# Patient Record
Sex: FEMALE | Race: WHITE | NOT HISPANIC OR LATINO | Employment: OTHER | URBAN - METROPOLITAN AREA
[De-identification: names, ages, dates, MRNs, and addresses within clinical notes are randomized per-mention and may not be internally consistent; named-entity substitution may affect disease eponyms.]

---

## 2023-12-07 ENCOUNTER — TRANSCRIBE ORDERS (OUTPATIENT)
Dept: ADMINISTRATIVE | Facility: HOSPITAL | Age: 43
End: 2023-12-07
Payer: MEDICARE

## 2023-12-07 DIAGNOSIS — Z12.31 SCREENING MAMMOGRAM, ENCOUNTER FOR: Primary | ICD-10-CM

## 2023-12-16 ENCOUNTER — HOSPITAL ENCOUNTER (OUTPATIENT)
Dept: MAMMOGRAPHY | Facility: HOSPITAL | Age: 43
Discharge: HOME | End: 2023-12-16
Admitting: FAMILY MEDICINE
Payer: MEDICARE

## 2023-12-16 DIAGNOSIS — Z12.31 SCREENING MAMMOGRAM, ENCOUNTER FOR: ICD-10-CM

## 2023-12-16 PROCEDURE — 77067 SCR MAMMO BI INCL CAD: CPT

## 2023-12-16 PROCEDURE — 77063 BREAST TOMOSYNTHESIS BI: CPT

## 2023-12-24 ENCOUNTER — HOSPITAL ENCOUNTER (EMERGENCY)
Facility: HOSPITAL | Age: 43
Discharge: HOME | End: 2023-12-24
Attending: EMERGENCY MEDICINE | Admitting: EMERGENCY MEDICINE
Payer: MEDICARE

## 2023-12-24 ENCOUNTER — APPOINTMENT (OUTPATIENT)
Dept: GENERAL RADIOLOGY | Facility: HOSPITAL | Age: 43
End: 2023-12-24
Payer: MEDICARE

## 2023-12-24 VITALS
OXYGEN SATURATION: 97 % | SYSTOLIC BLOOD PRESSURE: 134 MMHG | DIASTOLIC BLOOD PRESSURE: 77 MMHG | HEART RATE: 82 BPM | TEMPERATURE: 102.2 F | RESPIRATION RATE: 20 BRPM

## 2023-12-24 DIAGNOSIS — J10.1 INFLUENZA A: Primary | ICD-10-CM

## 2023-12-24 LAB
ALBUMIN SERPL-MCNC: 4.1 G/DL (ref 3.5–5.2)
ALBUMIN/GLOB SERPL: 1.9 G/DL
ALP SERPL-CCNC: 66 U/L (ref 39–117)
ALT SERPL W P-5'-P-CCNC: 14 U/L (ref 1–33)
ANION GAP SERPL CALCULATED.3IONS-SCNC: 11 MMOL/L (ref 5–15)
AST SERPL-CCNC: 18 U/L (ref 1–32)
B PARAPERT DNA SPEC QL NAA+PROBE: NOT DETECTED
B PERT DNA SPEC QL NAA+PROBE: NOT DETECTED
BASOPHILS # BLD AUTO: 0.05 10*3/MM3 (ref 0–0.2)
BASOPHILS NFR BLD AUTO: 0.8 % (ref 0–1.5)
BILIRUB SERPL-MCNC: <0.2 MG/DL (ref 0–1.2)
BUN SERPL-MCNC: 8 MG/DL (ref 6–20)
BUN/CREAT SERPL: 9.6 (ref 7–25)
C PNEUM DNA NPH QL NAA+NON-PROBE: NOT DETECTED
CALCIUM SPEC-SCNC: 8.8 MG/DL (ref 8.6–10.5)
CHLORIDE SERPL-SCNC: 102 MMOL/L (ref 98–107)
CO2 SERPL-SCNC: 24 MMOL/L (ref 22–29)
CREAT SERPL-MCNC: 0.83 MG/DL (ref 0.57–1)
DEPRECATED RDW RBC AUTO: 48.7 FL (ref 37–54)
EGFRCR SERPLBLD CKD-EPI 2021: 89.3 ML/MIN/1.73
EOSINOPHIL # BLD AUTO: 0.04 10*3/MM3 (ref 0–0.4)
EOSINOPHIL NFR BLD AUTO: 0.6 % (ref 0.3–6.2)
ERYTHROCYTE [DISTWIDTH] IN BLOOD BY AUTOMATED COUNT: 13.1 % (ref 12.3–15.4)
FLUAV H1 2009 PAND RNA NPH QL NAA+PROBE: DETECTED
FLUBV RNA ISLT QL NAA+PROBE: NOT DETECTED
GLOBULIN UR ELPH-MCNC: 2.2 GM/DL
GLUCOSE SERPL-MCNC: 88 MG/DL (ref 65–99)
HADV DNA SPEC NAA+PROBE: NOT DETECTED
HCOV 229E RNA SPEC QL NAA+PROBE: NOT DETECTED
HCOV HKU1 RNA SPEC QL NAA+PROBE: NOT DETECTED
HCOV NL63 RNA SPEC QL NAA+PROBE: NOT DETECTED
HCOV OC43 RNA SPEC QL NAA+PROBE: NOT DETECTED
HCT VFR BLD AUTO: 36.5 % (ref 34–46.6)
HGB BLD-MCNC: 11.9 G/DL (ref 12–15.9)
HMPV RNA NPH QL NAA+NON-PROBE: NOT DETECTED
HPIV1 RNA ISLT QL NAA+PROBE: NOT DETECTED
HPIV2 RNA SPEC QL NAA+PROBE: NOT DETECTED
HPIV3 RNA NPH QL NAA+PROBE: NOT DETECTED
HPIV4 P GENE NPH QL NAA+PROBE: NOT DETECTED
IMM GRANULOCYTES # BLD AUTO: 0.02 10*3/MM3 (ref 0–0.05)
IMM GRANULOCYTES NFR BLD AUTO: 0.3 % (ref 0–0.5)
LYMPHOCYTES # BLD AUTO: 0.51 10*3/MM3 (ref 0.7–3.1)
LYMPHOCYTES NFR BLD AUTO: 7.8 % (ref 19.6–45.3)
M PNEUMO IGG SER IA-ACNC: NOT DETECTED
MCH RBC QN AUTO: 32.7 PG (ref 26.6–33)
MCHC RBC AUTO-ENTMCNC: 32.6 G/DL (ref 31.5–35.7)
MCV RBC AUTO: 100.3 FL (ref 79–97)
MONOCYTES # BLD AUTO: 0.67 10*3/MM3 (ref 0.1–0.9)
MONOCYTES NFR BLD AUTO: 10.3 % (ref 5–12)
NEUTROPHILS NFR BLD AUTO: 5.21 10*3/MM3 (ref 1.7–7)
NEUTROPHILS NFR BLD AUTO: 80.2 % (ref 42.7–76)
NRBC BLD AUTO-RTO: 0 /100 WBC (ref 0–0.2)
PLATELET # BLD AUTO: 236 10*3/MM3 (ref 140–450)
PMV BLD AUTO: 9.9 FL (ref 6–12)
POTASSIUM SERPL-SCNC: 3.9 MMOL/L (ref 3.5–5.2)
PROT SERPL-MCNC: 6.3 G/DL (ref 6–8.5)
RBC # BLD AUTO: 3.64 10*6/MM3 (ref 3.77–5.28)
RHINOVIRUS RNA SPEC NAA+PROBE: NOT DETECTED
RSV RNA NPH QL NAA+NON-PROBE: NOT DETECTED
SARS-COV-2 RNA NPH QL NAA+NON-PROBE: NOT DETECTED
SODIUM SERPL-SCNC: 137 MMOL/L (ref 136–145)
TROPONIN T SERPL HS-MCNC: 11 NG/L
WBC NRBC COR # BLD AUTO: 6.5 10*3/MM3 (ref 3.4–10.8)

## 2023-12-24 PROCEDURE — 25810000003 SODIUM CHLORIDE 0.9 % SOLUTION: Performed by: EMERGENCY MEDICINE

## 2023-12-24 PROCEDURE — 84484 ASSAY OF TROPONIN QUANT: CPT | Performed by: EMERGENCY MEDICINE

## 2023-12-24 PROCEDURE — 93005 ELECTROCARDIOGRAM TRACING: CPT | Performed by: EMERGENCY MEDICINE

## 2023-12-24 PROCEDURE — 0202U NFCT DS 22 TRGT SARS-COV-2: CPT | Performed by: EMERGENCY MEDICINE

## 2023-12-24 PROCEDURE — 71045 X-RAY EXAM CHEST 1 VIEW: CPT

## 2023-12-24 PROCEDURE — 85025 COMPLETE CBC W/AUTO DIFF WBC: CPT | Performed by: EMERGENCY MEDICINE

## 2023-12-24 PROCEDURE — 80053 COMPREHEN METABOLIC PANEL: CPT | Performed by: EMERGENCY MEDICINE

## 2023-12-24 PROCEDURE — 99284 EMERGENCY DEPT VISIT MOD MDM: CPT

## 2023-12-24 RX ORDER — ACETAMINOPHEN 500 MG
1000 TABLET ORAL ONCE
Status: COMPLETED | OUTPATIENT
Start: 2023-12-24 | End: 2023-12-24

## 2023-12-24 RX ADMIN — SODIUM CHLORIDE 1000 ML: 9 INJECTION, SOLUTION INTRAVENOUS at 16:48

## 2023-12-24 RX ADMIN — ACETAMINOPHEN 1000 MG: 500 TABLET ORAL at 16:48

## 2023-12-24 NOTE — ED NOTES
Patient to ER via car from home for fever cough and rash on mouth x 3 days  Patient reports sore throat that is getting better  Patient reports cough is making her SOA

## 2023-12-24 NOTE — ED PROVIDER NOTES
EMERGENCY DEPARTMENT ENCOUNTER    Room Number:  35/35  PCP: Yesika Vanegas APRN  Historian: Patient      HPI:  Chief Complaint: Fever cough congestion  A complete HPI/ROS/PMH/PSH/SH/FH are unobtainable due to: None  Context: Judi Garcia is a 43 y.o. female who presents to the ED c/o fever cough congestion.  Patient states started 2 to 3 days ago.  Patient had sore throat.  Now has cough and congestion.  Has had body aches and fevers.  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  Patient has had some blisters on the outside of her mouth.  No lesions on her hands and feet.  Patient has had no vomiting or diarrhea.  Patient has had no chest pain.  States she has had a little bit of shortness of breath.  Has had no sick contacts.            PAST MEDICAL HISTORY  Active Ambulatory Problems     Diagnosis Date Noted    No Active Ambulatory Problems     Resolved Ambulatory Problems     Diagnosis Date Noted    No Resolved Ambulatory Problems     No Additional Past Medical History         PAST SURGICAL HISTORY  No past surgical history on file.      FAMILY HISTORY  No family history on file.      SOCIAL HISTORY  Social History     Socioeconomic History    Marital status:          ALLERGIES  Patient has no known allergies.        REVIEW OF SYSTEMS  Review of Systems   Cough congestion sore throat short of breath      PHYSICAL EXAM  ED Triage Vitals [12/24/23 1622]   Temp Heart Rate Resp BP SpO2   (!) 102.2 °F (39 °C) 90 20 -- 100 %      Temp src Heart Rate Source Patient Position BP Location FiO2 (%)   -- -- -- -- --       Physical Exam      GENERAL: no acute distress  HENT: nares patent  EYES: no scleral icterus  CV: regular rhythm, normal rate  RESPIRATORY: normal effort  ABDOMEN: soft  MUSCULOSKELETAL: no deformity  NEURO: alert, moves all extremities, follows commands  PSYCH:  calm, cooperative  SKIN: warm, dry    Vital signs and nursing notes reviewed.          LAB RESULTS  Recent  Results (from the past 24 hour(s))   ECG 12 Lead Dyspnea    Collection Time: 12/24/23  4:35 PM   Result Value Ref Range    QT Interval 367 ms    QTC Interval 424 ms   Single High Sensitivity Troponin T    Collection Time: 12/24/23  4:47 PM    Specimen: Blood   Result Value Ref Range    HS Troponin T 11 <14 ng/L   Comprehensive Metabolic Panel    Collection Time: 12/24/23  4:47 PM    Specimen: Blood   Result Value Ref Range    Glucose 88 65 - 99 mg/dL    BUN 8 6 - 20 mg/dL    Creatinine 0.83 0.57 - 1.00 mg/dL    Sodium 137 136 - 145 mmol/L    Potassium 3.9 3.5 - 5.2 mmol/L    Chloride 102 98 - 107 mmol/L    CO2 24.0 22.0 - 29.0 mmol/L    Calcium 8.8 8.6 - 10.5 mg/dL    Total Protein 6.3 6.0 - 8.5 g/dL    Albumin 4.1 3.5 - 5.2 g/dL    ALT (SGPT) 14 1 - 33 U/L    AST (SGOT) 18 1 - 32 U/L    Alkaline Phosphatase 66 39 - 117 U/L    Total Bilirubin <0.2 0.0 - 1.2 mg/dL    Globulin 2.2 gm/dL    A/G Ratio 1.9 g/dL    BUN/Creatinine Ratio 9.6 7.0 - 25.0    Anion Gap 11.0 5.0 - 15.0 mmol/L    eGFR 89.3 >60.0 mL/min/1.73   CBC Auto Differential    Collection Time: 12/24/23  4:47 PM    Specimen: Blood   Result Value Ref Range    WBC 6.50 3.40 - 10.80 10*3/mm3    RBC 3.64 (L) 3.77 - 5.28 10*6/mm3    Hemoglobin 11.9 (L) 12.0 - 15.9 g/dL    Hematocrit 36.5 34.0 - 46.6 %    .3 (H) 79.0 - 97.0 fL    MCH 32.7 26.6 - 33.0 pg    MCHC 32.6 31.5 - 35.7 g/dL    RDW 13.1 12.3 - 15.4 %    RDW-SD 48.7 37.0 - 54.0 fl    MPV 9.9 6.0 - 12.0 fL    Platelets 236 140 - 450 10*3/mm3    Neutrophil % 80.2 (H) 42.7 - 76.0 %    Lymphocyte % 7.8 (L) 19.6 - 45.3 %    Monocyte % 10.3 5.0 - 12.0 %    Eosinophil % 0.6 0.3 - 6.2 %    Basophil % 0.8 0.0 - 1.5 %    Immature Grans % 0.3 0.0 - 0.5 %    Neutrophils, Absolute 5.21 1.70 - 7.00 10*3/mm3    Lymphocytes, Absolute 0.51 (L) 0.70 - 3.10 10*3/mm3    Monocytes, Absolute 0.67 0.10 - 0.90 10*3/mm3    Eosinophils, Absolute 0.04 0.00 - 0.40 10*3/mm3    Basophils, Absolute 0.05 0.00 - 0.20 10*3/mm3     Immature Grans, Absolute 0.02 0.00 - 0.05 10*3/mm3    nRBC 0.0 0.0 - 0.2 /100 WBC   Respiratory Panel PCR w/COVID-19(SARS-CoV-2) [facility]/G[facility]EEVAN In-House, NP Swab in UTM/VTM, 2 HR TAT - Swab, Nasopharynx    Collection Time: 12/24/23  4:50 PM    Specimen: Nasopharynx; Swab   Result Value Ref Range    ADENOVIRUS, PCR Not Detected Not Detected    Coronavirus 229E Not Detected Not Detected    Coronavirus HKU1 Not Detected Not Detected    Coronavirus NL63 Not Detected Not Detected    Coronavirus OC43 Not Detected Not Detected    COVID19 Not Detected Not Detected - Ref. Range    Human Metapneumovirus Not Detected Not Detected    Human Rhinovirus/Enterovirus Not Detected Not Detected    Influenza A H1 2009 PCR Detected (A) Not Detected    Influenza B PCR Not Detected Not Detected    Parainfluenza Virus 1 Not Detected Not Detected    Parainfluenza Virus 2 Not Detected Not Detected    Parainfluenza Virus 3 Not Detected Not Detected    Parainfluenza Virus 4 Not Detected Not Detected    RSV, PCR Not Detected Not Detected    Bordetella pertussis pcr Not Detected Not Detected    Bordetella parapertussis PCR Not Detected Not Detected    Chlamydophila pneumoniae PCR Not Detected Not Detected    Mycoplasma pneumo by PCR Not Detected Not Detected       Ordered the above labs and reviewed the results.        RADIOLOGY  XR Chest 1 View    Result Date: 12/24/2023  XR CHEST 1 VW-  HISTORY: Shortness of air, cough and fever.  COMPARISON: None  FINDINGS: A single view of the chest was obtained.  Support Devices:  None. Cardiac Silhouette/Mediastinum/Tarsha:  The cardiac, mediastinal, and hilar contours are within normal limits. Lungs/Pleural Spaces:  The lungs and pleural spaces are clear. Chest Wall/Diaphragm/Upper Abdomen:  The thoracic musculoskeletal structures and the upper abdomen are within normal limits.   CONCLUSION(S):   1.  No focal consolidation or effusion.  This report was finalized on 12/24/2023 5:00 PM by Dr. Raza  Herman SANCHEZ on Workstation: [id number]       Ordered the above noted radiological studies.  Chest x-ray independently interpreted by me and shows no evidence of pneumonia          PROCEDURES  Procedures      EKG          EKG time: 1635  Rhythm/Rate: Normal sinus rhythm 80  P waves and PA: Normal P waves  QRS, axis: Normal QRS  ST and T waves: Normal ST-T wave    Interpreted Contemporaneously by me, independently viewed  No prior      MEDICATIONS GIVEN IN ER  Medications   acetaminophen (TYLENOL) tablet 1,000 mg (1,000 mg Oral Given 12/24/23 1648)   sodium chloride 0.9 % bolus 1,000 mL (1,000 mL Intravenous New Bag 12/24/23 1648)                   MEDICAL DECISION MAKING, PROGRESS, and CONSULTS     Discussion below represents my analysis of pertinent findings related to patient's condition, differential diagnosis, treatment plan and final disposition.      Additional sources:  - Discussed/ obtained information from independent historians: None    - External (non-ED) record review: Epic reviewed and patient seen for anxiety by primary provider 8/30/2023    - Chronic or social conditions impacting care: None    - Shared decision making: None      Orders placed during this visit:  Orders Placed This Encounter   Procedures    Respiratory Panel PCR w/COVID-19(SARS-CoV-2) [facility] In-House, NP Swab in UTM/VTM, 2 HR TAT - Swab, Nasopharynx    XR Chest 1 View    Single High Sensitivity Troponin T    Comprehensive Metabolic Panel    CBC Auto Differential    ECG 12 Lead Dyspnea    CBC & Differential         Additional orders considered but not ordered:  None        Differential diagnosis includes but is not limited to:    Influenza versus pneumonia versus other viral illness      Independent interpretation of labs, radiology studies, and discussions with consultants:  ED Course as of 12/24/23 1828   Sun Dec 24, 2023   1822 18:22 EST  Patient presents for evaluation of cough and fever.  Patient is influenza  positive.  Patient does have history of viral myocarditis however patient's heart rate is good EKG normal, troponin normal.  Patient states her symptoms probably were more like 3 to 4 days.  Tamiflu probably not effective.  Will be discharged home.  Instructed to return for worsening symptoms [SL]      ED Course User Index  [SL] Logan Morrissey MD                 DIAGNOSIS  Final diagnoses:   Influenza A         DISPOSITION  DISCHARGE    Patient discharged in stable condition.    Reviewed implications of results, diagnosis, meds, responsibility to follow up, warning signs and symptoms of possible worsening, potential complications and reasons to return to ER, including worsening symptoms    Patient/Family voiced understanding of above instructions.    Discussed plan for discharge, as there is no emergent indication for admission. Patient referred to primary care provider for BP management due to today's BP. Pt/family is agreeable and understands need for follow up and repeat testing.  Pt is aware that discharge does not mean that nothing is wrong but it indicates no emergency is present that requires admission and they must continue care with follow-up as given below or physician of their choice.     FOLLOW-UP  ViaYesika APRN  [address] [address]  [phone]    Schedule an appointment as soon as possible for a visit            Medication List      No changes were made to your prescriptions during this visit.                  Latest Documented Vital Signs:  As of 18:28 EST  BP- 134/77 HR- 82 Temp- (!) 102.2 °F (39 °C) O2 sat- 97%              --    Please note that portions of this were completed with a voice recognition program.       Note Disclaimer: At [facility], we believe that sharing information builds trust and better relationships. You are receiving this note because you are receiving care at [facility] or recently visited. It is possible you will see [facility]  information before a provider has talked with you about it. This kind of information can be easy to misunderstand. To help you fully understand what it means for your health, we urge you to discuss this note with your provider.            Logan Morrissey MD  12/24/23 1923

## 2023-12-27 ENCOUNTER — NURSE TRIAGE (OUTPATIENT)
Dept: CALL CENTER | Facility: HOSPITAL | Age: 43
End: 2023-12-27
Payer: MEDICARE

## 2023-12-27 ENCOUNTER — TELEPHONE (OUTPATIENT)
Dept: PEDIATRICS | Facility: OTHER | Age: 43
End: 2023-12-27
Payer: MEDICARE

## 2023-12-27 LAB
QT INTERVAL: 367 MS
QTC INTERVAL: 424 MS

## 2023-12-27 NOTE — TELEPHONE ENCOUNTER
Says she does not have a PCP. She is in between doctors.  She had an appt today but had to cancel she was afraid to drive due to coughing.  Feels she needs a breathing treatment.  Care advice per guideline.        Reason for Disposition   SEVERE coughing spells (e.g., whooping sound after coughing, vomiting after coughing)    Additional Information   Negative: SEVERE difficulty breathing (e.g., struggling for each breath, speaks in single words)   Negative: Bluish (or gray) lips or face now   Negative: [1] Rapid onset of cough AND [2] has hives   Negative: Coughing started suddenly after medicine, an allergic food or bee sting   Negative: [1] Difficulty breathing AND [2] exposure to flames, smoke, or fumes   Negative: [1] Stridor AND [2] difficulty breathing   Negative: Sounds like a life-threatening emergency to the triager   Negative: Choked on object of food that could be caught in the throat   Negative: Chest pain is main symptom   Negative: [1] Previous asthma attacks AND [2] this feels like asthma attack   Negative: Cough lasts > 3 weeks   Negative: Wet cough (productive; white-yellow, yellow, green, or makenna colored sputum)   Negative: [1] Dry cough (non-productive;  no sputum or minimal clear sputum) AND [2] within 14 days of COVID-19 Exposure   Negative: [1] MODERATE difficulty breathing (e.g., speaks in phrases, SOB even at rest, pulse 100-120) AND [2] still present when not coughing   Negative: Chest pain  (Exception: MILD central chest pain, present only when coughing.)   Negative: Patient sounds very sick or weak to the triager   Negative: [1] MILD difficulty breathing (e.g., minimal/no SOB at rest, SOB with walking, pulse <100) AND [2] still present when not coughing   Negative: [1] Coughed up blood AND [2] > 1 tablespoon (15 ml)   (Exception: Blood-tinged sputum.)   Negative: Fever > 103 F (39.4 C)   Negative: [1] Fever > 101 F (38.3 C) AND [2] age > 60 years   Negative: [1] Fever > 100.0 F (37.8 C)  "AND [2] bedridden (e.g., CVA, chronic illness, recovering from surgery)   Negative: [1] Fever > 100.0 F (37.8 C) AND [2] diabetes mellitus or weak immune system (e.g., HIV positive, cancer chemo, splenectomy, organ transplant, chronic steroids)   Negative: Wheezing is present   Negative: [1] Ankle swelling AND [2] swelling is increasing    Answer Assessment - Initial Assessment Questions  1. ONSET: \"When did the cough begin?\"       *No Answer*  2. SEVERITY: \"How bad is the cough today?\"      severe  3. SPUTUM: \"Describe the color of your sputum\" (none, dry cough; clear, white, yellow, green)      Yellow phelgm  4. HEMOPTYSIS: \"Are you coughing up any blood?\" If so ask: \"How much?\" (flecks, streaks, tablespoons, etc.)    denies  5. DIFFICULTY BREATHING: \"Are you having difficulty breathing?\" If Yes, ask: \"How bad is it?\" (e.g., mild, moderate, severe)     - MILD: No SOB at rest, mild SOB with walking, speaks normally in sentences, can lie down, no retractions, pulse < 100.     - MODERATE: SOB at rest, SOB with minimal exertion and prefers to sit, cannot lie down flat, speaks in phrases, mild retractions, audible wheezing, pulse 100-120.     - SEVERE: Very SOB at rest, speaks in single words, struggling to breathe, sitting hunched forward, retractions, pulse > 120       Has to sit up  6. FEVER: \"Do you have a fever?\" If Yes, ask: \"What is your temperature, how was it measured, and when did it start?\"      Off and on  101  7. CARDIAC HISTORY: \"Do you have any history of heart disease?\" (e.g., heart attack, congestive heart failure)       Has pericarditis once  8. LUNG HISTORY: \"Do you have any history of lung disease?\"  (e.g., pulmonary embolus, asthma, emphysema)     Asthma as a child  9. PE RISK FACTORS: \"Do you have a history of blood clots?\" (or: recent major surgery, recent prolonged travel, bedridden)      *No Answer*  10. OTHER SYMPTOMS: \"Do you have any other symptoms?\" (e.g., runny nose, wheezing, chest " "pain)     wheezing  11. PREGNANCY: \"Is there any chance you are pregnant?\" \"When was your last menstrual period?\"        *No Answer*  12. TRAVEL: \"Have you traveled out of the country in the last month?\" (e.g., travel history, exposures)        *No Answer*    Protocols used: Cough - Acute Non-Productive-ADULT-AH    "

## 2023-12-27 NOTE — TELEPHONE ENCOUNTER
Caller: Judi Garcia    Relationship to patient: Self    Best call back number: [phone]     Chief complaint: TROUBLE BREATHING    Patient directed to call 911 or go to their nearest emergency room.     Patient verbalized understanding: [x] Yes  [] No  If no, why?    Additional notes: PATIENT CALLED IN STATING SHE HAD A NEW PATIENT APPOINTMENT WITH [facility] TO ESTABLISH CARE. SHE STATES SHE WAS IN THE EMERGENCY ROOM ON 12/25/23 DUE TO TROUBLE BREATHING. HUB ADVISED PATIENT IF SHE WAS STILL HAVING TROUBLE BREATHING, TO GO TO ER AND SHE UNDERSTOOD.       PLEASE ADVISE IF ANY QUESTIONS OR CONCERNS